# Patient Record
Sex: MALE | URBAN - METROPOLITAN AREA
[De-identification: names, ages, dates, MRNs, and addresses within clinical notes are randomized per-mention and may not be internally consistent; named-entity substitution may affect disease eponyms.]

---

## 2020-12-21 ENCOUNTER — TELEPHONE (OUTPATIENT)
Dept: UROLOGY | Facility: CLINIC | Age: 60
End: 2020-12-21

## 2020-12-21 NOTE — TELEPHONE ENCOUNTER
Contacted Isamar at Dr. Stark's office and she informed nurse that pt will f/u regarding path report from 2018 in [place], nurse verbalized understanding. NB      ----- Message from Patricia Ivey sent at 12/21/2020 11:11 AM CST -----  Regarding: Pt advice  Contact: Isamar Palacios/Dr Miguel Stark/MD Rafi Palacios/Dr Miguel Stark/MD Mckee is calling to get pathology report and slide on pt. Please call back at [phone]//thank you acc